# Patient Record
Sex: FEMALE | Race: BLACK OR AFRICAN AMERICAN | URBAN - METROPOLITAN AREA
[De-identification: names, ages, dates, MRNs, and addresses within clinical notes are randomized per-mention and may not be internally consistent; named-entity substitution may affect disease eponyms.]

---

## 2024-06-14 ENCOUNTER — TELEPHONE (OUTPATIENT)
Dept: HEMATOLOGY/ONCOLOGY | Facility: CLINIC | Age: 74
End: 2024-06-14
Payer: MEDICARE

## 2024-06-14 DIAGNOSIS — C90.00 MULTIPLE MYELOMA: Primary | ICD-10-CM

## 2024-06-14 NOTE — TELEPHONE ENCOUNTER
----- Message from Jad Chavarria sent at 6/14/2024  8:17 AM CDT -----  Regarding: Hematology Oncology Referral  New  Patient Intake Documentation     Diagnosis: Multiple Myeloma      Date patient referral received: 06/13/2024     Records collected: 06/13/2024       What doctor have you seen for this diagnosis?Elian Davey MD           Have you been diagnosed with cancer by a biopsy and/or surgery? Bone Marrow Biopsy     Date of biopsy:05/06/2024    Date of surgery:05/06/2024    Where did that occur? [facility]     Pt Referral has been placed in

## 2024-06-14 NOTE — NURSING
Oncology Navigation   Intake  Cancer Type: Myeloma  Type of Referral: External  Date of Referral: 06/14/24  Initial Nurse Navigator Contact: 06/14/24  Referral to Initial Contact Timeline (days): 0  First Appointment Available: 07/25/24 (ok per dr lott, asked in person)  Appointment Date: 07/25/24  First Available Date vs. Scheduled Date (days): 0  Multiple appointments: No     Treatment                              Acuity      Follow Up  No follow-ups on file.

## 2024-06-17 ENCOUNTER — TELEPHONE (OUTPATIENT)
Dept: HEMATOLOGY/ONCOLOGY | Facility: CLINIC | Age: 74
End: 2024-06-17
Payer: MEDICARE

## 2024-06-17 NOTE — TELEPHONE ENCOUNTER
Called and spoke to  Patient       Appointment scheduled on 7/25 with Dr Tovar.  All questions and concerns addressed. Provided my name and direct number and instructed Patient  to call with any questions and/or concerns.     Thanks,  VIOLETA GutierrezN, RN-BC  BMT Nurse Navigator  [facility] [address], [place], [place] [address]  _________________________  o [phone]

## 2024-06-17 NOTE — TELEPHONE ENCOUNTER
----- Message from Hardeep Drew sent at 6/14/2024  2:52 PM CDT -----  Regarding: RE: Hematology Oncology Referral  Will  have to call patient to inquiry about what medicare supplement he has.    Rajendra  ----- Message -----  From: Finesse Charles RN  Sent: 6/14/2024   2:38 PM CDT  To: Hardeep Drew  Subject: FW: Hematology Oncology Referral                 Mrn [phone] please verify car t tx benefits  ----- Message -----  From: Jad Chavarria  Sent: 6/14/2024   8:17 AM CDT  To: [facility] Cancer Navigation  Subject: Hematology Oncology Referral                     New  Patient Intake Documentation     Diagnosis: Multiple Myeloma      Date patient referral received: 06/13/2024     Records collected: 06/13/2024       What doctor have you seen for this diagnosis?Elian Davey MD           Have you been diagnosed with cancer by a biopsy and/or surgery? Bone Marrow Biopsy     Date of biopsy:05/06/2024    Date of surgery:05/06/2024    Where did that occur? [facility]     Pt Referral has been placed in

## 2024-06-17 NOTE — NURSING
Oncology Navigation   Intake  Cancer Type: Lymphoma  Type of Referral: External  Date of Referral: 06/13/24  Initial Nurse Navigator Contact: 06/13/24  Referral to Initial Contact Timeline (days): 0  First Appointment Available: 07/25/24  Appointment Date: 07/25/24  First Available Date vs. Scheduled Date (days): 0  Multiple appointments: No     Treatment                              Acuity      Follow Up  No follow-ups on file.

## 2024-06-21 ENCOUNTER — LAB VISIT (OUTPATIENT)
Dept: LAB | Facility: HOSPITAL | Age: 74
End: 2024-06-21
Attending: INTERNAL MEDICINE
Payer: MEDICARE

## 2024-06-21 DIAGNOSIS — C90.00 MULTIPLE MYELOMA: ICD-10-CM

## 2024-06-21 PROCEDURE — 88325 CONSLTJ COMPRE RVW REC REPRT: CPT | Performed by: PATHOLOGY

## 2024-06-26 LAB
FINAL PATHOLOGIC DIAGNOSIS: NORMAL
GROSS: NORMAL
Lab: NORMAL
MICROSCOPIC EXAM: NORMAL
SUPPLEMENTAL DIAGNOSIS: NORMAL

## 2024-07-25 ENCOUNTER — OFFICE VISIT (OUTPATIENT)
Dept: HEMATOLOGY/ONCOLOGY | Facility: CLINIC | Age: 74
End: 2024-07-25
Payer: MEDICARE

## 2024-07-25 ENCOUNTER — LAB VISIT (OUTPATIENT)
Dept: LAB | Facility: HOSPITAL | Age: 74
End: 2024-07-25
Payer: MEDICARE

## 2024-07-25 VITALS
SYSTOLIC BLOOD PRESSURE: 169 MMHG | RESPIRATION RATE: 20 BRPM | HEART RATE: 65 BPM | DIASTOLIC BLOOD PRESSURE: 73 MMHG | HEIGHT: 63 IN | TEMPERATURE: 98 F | BODY MASS INDEX: 31.33 KG/M2 | WEIGHT: 176.81 LBS | OXYGEN SATURATION: 98 %

## 2024-07-25 DIAGNOSIS — C90.00 MULTIPLE MYELOMA, REMISSION STATUS UNSPECIFIED: Primary | ICD-10-CM

## 2024-07-25 DIAGNOSIS — C90.00 MULTIPLE MYELOMA, REMISSION STATUS UNSPECIFIED: ICD-10-CM

## 2024-07-25 LAB
ALBUMIN SERPL BCP-MCNC: 3.6 G/DL (ref 3.5–5.2)
ALP SERPL-CCNC: 93 U/L (ref 55–135)
ALT SERPL W/O P-5'-P-CCNC: 13 U/L (ref 10–44)
ANION GAP SERPL CALC-SCNC: 6 MMOL/L (ref 8–16)
AST SERPL-CCNC: 13 U/L (ref 10–40)
BASOPHILS # BLD AUTO: 0.01 K/UL (ref 0–0.2)
BASOPHILS NFR BLD: 0.3 % (ref 0–1.9)
BILIRUB SERPL-MCNC: 0.8 MG/DL (ref 0.1–1)
BUN SERPL-MCNC: 13 MG/DL (ref 8–23)
CALCIUM SERPL-MCNC: 9.2 MG/DL (ref 8.7–10.5)
CHLORIDE SERPL-SCNC: 111 MMOL/L (ref 95–110)
CO2 SERPL-SCNC: 25 MMOL/L (ref 23–29)
CREAT SERPL-MCNC: 1.1 MG/DL (ref 0.5–1.4)
DIFFERENTIAL METHOD BLD: ABNORMAL
EOSINOPHIL # BLD AUTO: 0.1 K/UL (ref 0–0.5)
EOSINOPHIL NFR BLD: 2.5 % (ref 0–8)
ERYTHROCYTE [DISTWIDTH] IN BLOOD BY AUTOMATED COUNT: 19.9 % (ref 11.5–14.5)
EST. GFR  (NO RACE VARIABLE): 53.7 ML/MIN/1.73 M^2
GLUCOSE SERPL-MCNC: 101 MG/DL (ref 70–110)
HCT VFR BLD AUTO: 28.3 % (ref 37–48.5)
HGB BLD-MCNC: 10 G/DL (ref 12–16)
IGA SERPL-MCNC: 29 MG/DL (ref 40–350)
IGG SERPL-MCNC: 406 MG/DL (ref 650–1600)
IGM SERPL-MCNC: 9 MG/DL (ref 50–300)
IMM GRANULOCYTES # BLD AUTO: 0.01 K/UL (ref 0–0.04)
IMM GRANULOCYTES NFR BLD AUTO: 0.3 % (ref 0–0.5)
LYMPHOCYTES # BLD AUTO: 0.6 K/UL (ref 1–4.8)
LYMPHOCYTES NFR BLD: 17.7 % (ref 18–48)
MCH RBC QN AUTO: 32.6 PG (ref 27–31)
MCHC RBC AUTO-ENTMCNC: 35.3 G/DL (ref 32–36)
MCV RBC AUTO: 92 FL (ref 82–98)
MONOCYTES # BLD AUTO: 0.6 K/UL (ref 0.3–1)
MONOCYTES NFR BLD: 18.3 % (ref 4–15)
NEUTROPHILS # BLD AUTO: 2 K/UL (ref 1.8–7.7)
NEUTROPHILS NFR BLD: 60.9 % (ref 38–73)
NRBC BLD-RTO: 0 /100 WBC
PLATELET # BLD AUTO: 97 K/UL (ref 150–450)
PMV BLD AUTO: 12.2 FL (ref 9.2–12.9)
POTASSIUM SERPL-SCNC: 4.2 MMOL/L (ref 3.5–5.1)
PROT SERPL-MCNC: 5.9 G/DL (ref 6–8.4)
RBC # BLD AUTO: 3.07 M/UL (ref 4–5.4)
SODIUM SERPL-SCNC: 142 MMOL/L (ref 136–145)
WBC # BLD AUTO: 3.22 K/UL (ref 3.9–12.7)

## 2024-07-25 PROCEDURE — 84165 PROTEIN E-PHORESIS SERUM: CPT | Performed by: INTERNAL MEDICINE

## 2024-07-25 PROCEDURE — 36415 COLL VENOUS BLD VENIPUNCTURE: CPT | Performed by: INTERNAL MEDICINE

## 2024-07-25 PROCEDURE — 83521 IG LIGHT CHAINS FREE EACH: CPT | Mod: 59 | Performed by: INTERNAL MEDICINE

## 2024-07-25 PROCEDURE — 80053 COMPREHEN METABOLIC PANEL: CPT | Performed by: INTERNAL MEDICINE

## 2024-07-25 PROCEDURE — G2211 COMPLEX E/M VISIT ADD ON: HCPCS | Mod: S$PBB,,, | Performed by: INTERNAL MEDICINE

## 2024-07-25 PROCEDURE — 99999 PR PBB SHADOW E&M-EST. PATIENT-LVL IV: CPT | Mod: PBBFAC,,, | Performed by: INTERNAL MEDICINE

## 2024-07-25 PROCEDURE — 84165 PROTEIN E-PHORESIS SERUM: CPT | Mod: 26,,, | Performed by: PATHOLOGY

## 2024-07-25 PROCEDURE — 99205 OFFICE O/P NEW HI 60 MIN: CPT | Mod: S$PBB,GC,, | Performed by: INTERNAL MEDICINE

## 2024-07-25 PROCEDURE — 85025 COMPLETE CBC W/AUTO DIFF WBC: CPT | Performed by: INTERNAL MEDICINE

## 2024-07-25 PROCEDURE — 86334 IMMUNOFIX E-PHORESIS SERUM: CPT | Mod: 26,,, | Performed by: PATHOLOGY

## 2024-07-25 PROCEDURE — 82784 ASSAY IGA/IGD/IGG/IGM EACH: CPT | Performed by: INTERNAL MEDICINE

## 2024-07-25 PROCEDURE — 99214 OFFICE O/P EST MOD 30 MIN: CPT | Mod: PBBFAC | Performed by: INTERNAL MEDICINE

## 2024-07-25 PROCEDURE — 86334 IMMUNOFIX E-PHORESIS SERUM: CPT | Performed by: INTERNAL MEDICINE

## 2024-07-25 RX ORDER — CARVEDILOL 25 MG/1
TABLET ORAL
COMMUNITY

## 2024-07-25 RX ORDER — HYDROCORTISONE 25 MG/G
CREAM TOPICAL 2 TIMES DAILY
COMMUNITY
Start: 2024-06-14

## 2024-07-25 RX ORDER — METFORMIN HYDROCHLORIDE 1000 MG/1
1000 TABLET ORAL 2 TIMES DAILY
COMMUNITY
Start: 2024-02-06

## 2024-07-25 RX ORDER — HYDROCHLOROTHIAZIDE 12.5 MG/1
12.5 CAPSULE ORAL
COMMUNITY
Start: 2024-07-21

## 2024-07-25 RX ORDER — NAPROXEN AND ESOMEPRAZOLE MAGNESIUM 20; 500 MG/1; MG/1
1 TABLET, DELAYED RELEASE ORAL EVERY 12 HOURS PRN
COMMUNITY
Start: 2024-02-10

## 2024-07-25 RX ORDER — CLOTRIMAZOLE 1 %
CREAM (GRAM) TOPICAL 2 TIMES DAILY
COMMUNITY
Start: 2024-05-15

## 2024-07-25 RX ORDER — ONDANSETRON 8 MG/1
8 TABLET, ORALLY DISINTEGRATING ORAL EVERY 8 HOURS PRN
COMMUNITY
Start: 2024-05-17 | End: 2025-05-17

## 2024-07-25 RX ORDER — FLUTICASONE PROPIONATE 50 MCG
2 SPRAY, SUSPENSION (ML) NASAL
COMMUNITY
Start: 2023-12-07

## 2024-07-25 RX ORDER — POTASSIUM CHLORIDE 750 MG/1
10 TABLET, EXTENDED RELEASE ORAL
COMMUNITY
Start: 2024-05-30 | End: 2025-05-30

## 2024-07-25 RX ORDER — DEXAMETHASONE 4 MG/1
TABLET ORAL
COMMUNITY
Start: 2024-05-07

## 2024-07-25 RX ORDER — ATORVASTATIN CALCIUM 40 MG/1
40 TABLET, FILM COATED ORAL
COMMUNITY
Start: 2024-05-02 | End: 2025-05-03

## 2024-07-25 RX ORDER — OMEPRAZOLE 40 MG/1
40 CAPSULE, DELAYED RELEASE ORAL
COMMUNITY
Start: 2024-06-11

## 2024-07-25 RX ORDER — FUROSEMIDE 20 MG/1
20 TABLET ORAL
COMMUNITY
Start: 2024-05-15 | End: 2025-05-16

## 2024-07-25 RX ORDER — LENALIDOMIDE 15 MG/1
CAPSULE ORAL
COMMUNITY
Start: 2024-05-09

## 2024-07-25 RX ORDER — BLOOD-GLUCOSE METER
EACH MISCELLANEOUS
COMMUNITY

## 2024-07-25 RX ORDER — GLIPIZIDE 2.5 MG/1
2.5 TABLET, EXTENDED RELEASE ORAL
COMMUNITY
Start: 2024-05-15

## 2024-07-26 LAB
ALBUMIN SERPL ELPH-MCNC: 3.59 G/DL (ref 3.35–5.55)
ALPHA1 GLOB SERPL ELPH-MCNC: 0.31 G/DL (ref 0.17–0.41)
ALPHA2 GLOB SERPL ELPH-MCNC: 0.63 G/DL (ref 0.43–0.99)
B-GLOBULIN SERPL ELPH-MCNC: 0.61 G/DL (ref 0.5–1.1)
GAMMA GLOB SERPL ELPH-MCNC: 0.36 G/DL (ref 0.67–1.58)
INTERPRETATION SERPL IFE-IMP: NORMAL
KAPPA LC SER QL IA: 0.75 MG/DL (ref 0.33–1.94)
KAPPA LC/LAMBDA SER IA: 0.82 (ref 0.26–1.65)
LAMBDA LC SER QL IA: 0.91 MG/DL (ref 0.57–2.63)
PROT SERPL-MCNC: 5.5 G/DL (ref 6–8.4)

## 2024-07-28 NOTE — PROGRESS NOTES
HEMATOLOGY NEW PATIENT EVALUATION      Subjective:     IDENTIFYING STATEMENT:   Shyann Diaz (Shyann) is a 73 y.o. female with a date of birth of 1950 from [place], [place] with the diagnosis of MM who was seen at the request of Dr. Davey, Elian LAI MD  [address] [place] [id number] and is here for evaluation for further evaluation.    HPI:     She was admitted to the hospital in 05/2024, found to be in severe BRIANA and on further work up diagnosed with multiple myeloma, started treatment with Claudine-VRd. C1 Claudine-VRd started 05/10/2024 and last dose Claudine-Bortezomib on 07/19/2024.     She is accompanied by her two sisters. She is currently doing okay, uses cane to ambulate. She is not driving but has excellent family support. Her appetite is slowly improving. Diarrhea worse on some days than other but has not required imodium consistently. Pain is persistent but somewhat improvement after RT.     Myeloma History:   - Bone marrow biopsy 5/3/2024: lambda-restricted plasma cell neoplasm, plasma cell up to 50%. Cytogenetics: Gain 1q, Trisomy 9, 11, and 17. Monosomy 13.  - FDG PET/CT 5/22/2024: multiple lytic lesions to c, t, and l-spine as well as proximal femoral diaphyses.   - Treatment:   - RT to L2 and L pelvis with Dr. Christopher, completed in 06/2024   - Claudine-VRd started 05/10/204     Review of Systems   Constitutional:  Positive for malaise/fatigue. Negative for chills, fever and weight loss.   HENT: Negative.  Negative for congestion and hearing loss.    Eyes: Negative.    Respiratory: Negative.     Cardiovascular:  Positive for leg swelling. Negative for chest pain, palpitations and orthopnea.   Gastrointestinal: Negative.    Genitourinary: Negative.    Musculoskeletal:  Positive for joint pain.   Skin:  Positive for rash.   Neurological: Negative.    Endo/Heme/Allergies: Negative.    Psychiatric/Behavioral: Negative.         No past medical history on file.    No family history  "on file.         Medication List with Changes/Refills   Current Medications    ACCU-CHEK GUIDE ME GLUCOSE MTR MISC    USE 1 STRIP TO CHECK GLUCOSE ONCE DAILY    ATORVASTATIN (LIPITOR) 40 MG TABLET    Take 40 mg by mouth.    BACILLUS-PROTEASE-AMYLAS-LIPAS 1 BILLION CELL- 30,000 UNIT CAP    Take 1 capsule by mouth once daily.    CARVEDILOL (COREG) 25 MG TABLET    SMARTSI Tablet(s) By Mouth Morning-Evening    CLOTRIMAZOLE (LOTRIMIN) 1 % CREAM    Apply topically 2 (two) times daily.    DEXAMETHASONE (DECADRON) 4 MG TAB    SMARTSIG:10 Tablet(s) By Mouth Every Morning    FLUTICASONE PROPIONATE (FLONASE) 50 MCG/ACTUATION NASAL SPRAY    2 sprays by Nasal route.    FUROSEMIDE (LASIX) 20 MG TABLET    Take 20 mg by mouth.    GLIPIZIDE (GLUCOTROL) 2.5 MG TR24    Take 2.5 mg by mouth.    HYDROCHLOROTHIAZIDE (MICROZIDE) 12.5 MG CAPSULE    Take 12.5 mg by mouth.    HYDROCORTISONE 2.5 % CREAM    Apply topically 2 (two) times daily.    INV ASPIRIN TABLET    Take 81 mg by mouth.    LENALIDOMIDE 15 MG CAP    Take for 21 days then off 7 days    METFORMIN (GLUCOPHAGE) 1000 MG TABLET    Take 1,000 mg by mouth 2 (two) times daily.    NAPROXEN-ESOMEPRAZOLE (VIMOVO) 500-20 MG TBID    Take 1 tablet by mouth every 12 (twelve) hours as needed.    OMEPRAZOLE (PRILOSEC) 40 MG CAPSULE    Take 40 mg by mouth.    ONDANSETRON (ZOFRAN-ODT) 8 MG TBDL    Take 8 mg by mouth every 8 (eight) hours as needed.    POTASSIUM CHLORIDE SA (K-DUR,KLOR-CON M) 10 MEQ TABLET    Take 10 mEq by mouth.     Review of patient's allergies indicates:   Allergen Reactions    Iodine Hives and Itching    Iohexol Other (See Comments)     BLISTERS       There is no problem list on file for this patient.          Objective:      BP (!) 169/73 (BP Location: Left arm, Patient Position: Sitting, BP Method: Medium (Automatic))   Pulse 65   Temp 97.7 °F (36.5 °C) (Oral)   Resp 20   Ht 5' 3" (1.6 m)   Wt 80.2 kg (176 lb 12.9 oz)   SpO2 98%   BMI 31.32 kg/m²   Estimated body " "mass index is 31.32 kg/m² as calculated from the following:    Height as of this encounter: 5' 3" (1.6 m).    Weight as of this encounter: 80.2 kg (176 lb 12.9 oz).  Physical Exam  Constitutional:       General: She is not in acute distress.     Appearance: Normal appearance. She is ill-appearing.   HENT:      Head: Normocephalic and atraumatic.   Eyes:      Extraocular Movements: Extraocular movements intact.      Conjunctiva/sclera: Conjunctivae normal.      Pupils: Pupils are equal, round, and reactive to light.   Cardiovascular:      Rate and Rhythm: Normal rate and regular rhythm.   Pulmonary:      Effort: Pulmonary effort is normal.      Breath sounds: Normal breath sounds. No wheezing.   Abdominal:      General: Abdomen is flat. Bowel sounds are normal. There is no distension.      Palpations: Abdomen is soft.   Musculoskeletal:      Right lower leg: Edema present.      Left lower leg: Edema present.   Skin:     General: Skin is warm.      Capillary Refill: Capillary refill takes less than 2 seconds.   Neurological:      General: No focal deficit present.      Mental Status: She is alert and oriented to person, place, and time.   Psychiatric:         Mood and Affect: Mood normal.         Behavior: Behavior normal.         Thought Content: Thought content normal.       Bone Marrow Biopsy 5/6/24:  Results for orders placed or performed in visit on 06/21/24   Specimen to Pathology Other   Result Value Ref Range    Final Pathologic Diagnosis       BONE MARROW ASPIRATE, CELL PARTICLE, AND DECALCIFIED CORE BIOPSY:   RIGHT POSTEROSUPERIOR ILIAC CREST: Outside institution consultation slides (9) and block (B1) from [place], [place] (accession #: [id number], collected: 5/6/24)  - LAMBDA LIGHT CHAIN RESTRICTED, CD56+, + PLASMA CELL NEOPLASM  - LIMITED SPECIMEN: Findings may not be entirely representative  - Variably cellular marrow (20-50% total cellularity) with 60-70% involvement by " plasma cell neoplasm and residual background trilineage hematopoiesis  - Lambda light chain restricted, CD56+, + plasma cells (4% of total analyzed events) detected by flow cytometry  - Normal female karyotype: 46,XX[20]  - FISH positive for gain of 1q, trisomies 9, 11, and 17, monosomy 13, and IGH rearrangement and loss of MAF  - No stainable histiocytic iron stores   - No significantly increased reticulin fibrosis (MF-0)    PERIPHERAL BLOOD: Outside institution consultation slide (1) from [place], [place] (collected: 5/6/24 @ 16:36)  - No circulating plasma cells  - Moderate normocytic, normochromic anemia with no significant rouleaux formation      Supplemental Diagnosis       Clinical diagnosis / information and documentation of slides received (gross section) were added. Remainder of report remains unchanged.    Gross       Please see scanned original pathology report(s) for gross description.  [id number] (10 stained slides, 1 block, collected: 5/6/24) received from [place], [place] with corresponding pathology and ancillary studies reports (see scanned/attached reports for complete details): 1 stained peripheral   blood smear (05/06/24: [id number]), 3 stained aspirate smears (05/07/24: [id number], [id number], [id number]), 2 H&E slides: 1 cell particle (05/06/24: [id number]), 1 decalcified core biopsy (05/06/24: [id number]), 1 IHC on B1 with   control on same slide:  (05/07/24: [id number]), 3 special stains: 1 iron-stained aspirate smear (05/06/24: [id number]), 1 iron-stained decalcified core biopsy (05/06/24: [id number]), 1 reticulin-stained decalcified core biopsy   (05/06/24: [id number]). One (1) block labeled [id number] and no unstained tissue slides were received.    Flow cytometric analysis (per reference lab report, performe d and interpreted at [facility], [place], [place]; reported:  5/7/24, #[id number]):     Monoclonal plasma cells identified. Using a limited staining panel demonstrates that approximately 4% of total leukocytes are plasma cells, as determined by co-expression of CD45, , and CD38 (bright). This population is positive for expression of   CD56 and  and negative for CD19. The plasma cells are monoclonal as determined by cytoplasmic kappa:lambda ratio (0.31). As the quantification of plasma cells by flow cytometry is technically limited and often underestimated, quantification of the   plasma cell burden must be correlated with the morphologic findings.    Myeloma/PCN FISH panel analysis (per outside institution pathology report, performed and interpreted at [facility] [place], [place]; reported: 6/17/24, #[id number]):     POSITIVE for gain of 1q, trisomies 9, 11, and 17, monosomy 13, and IGH rearrangement and loss of MAF    Negative for MYC rearrangement and for IGH::FG FR3 (4;14), IGH::CCND3 (6;14), IGH:: MYC (8;14), IGH::CCND1 (11;14), IGH::MAF (14;16), and IGH::MAFB (14;20) fusions    Cytogenetic analysis (per outside institution pathology report, performed and interpreted at [place], [place]; reported: 5/14/24, #[id number]):     Normal karyotype: 46,XX[20].      Microscopic Exam       PERIPHERAL BLOOD (5/6/24 @ 16:36, per outside institution patology report):  WBC: 7.4 k/uL, HGB: 8.2 g/dL, HCT: 23.6%, MCV: 81.1 fL, MCH: 28.2 pg, Platelets: 176 k/uL, Neutrophils: 60%, Lymphocytes: 36%, Monocytes: --%, Eosinophils: 4%, Basophils: --%    One peripheral blood smear available for review.    RBC: Moderate normocytic, normochromic anemia with mild anisopoikilocytosis with no significantly increased polychromasia, rouleaux formation, or agglutinins  WBC & Platelets: No significant diagnostic abnormalities. No circulating plasma cells    BONE MARROW ADEQUACY:  Aspirate: Non-diagnostic, aspiculate, hemodilute, and very paucicellular  Cell Particle:  Non-diagnostic, minute fragment of cortical bone   Biopsy: Adequate but predominantly tangential cortical bone in 1/2 cores; 20-50% total cellularity    BONE MARROW ASPIRATE: Findings not  representative of the marrow  No cytomorphologic evaluation or manual differential count were performed.    Iron Stain (1 smear performed at the  outside institution and reviewed at [facility]): Stainable histiocytic iron stores and ring sideroblasts cannot be optimally evaluated due to aspiculate, hemodilute stained aspirate smear.     BONE MARROW CLOT & CORE BIOPSY: Tissue sections show variably cellular marrow with increased focal sheets of plasma cells with very rare Era bodies and residual trilineage hematopoiesis. Megakaryocytes appear adequate in number and show no   significant atypical features or abnormal clusters. No overt increase in lymphocytes or lymphoid aggregates are appreciated.    Tissue sections show no morphologic evidence of metastatic carcinoma infiltration, granulomata, fibrosis, or necrosis. Bony trabeculae are unremarkable.     immunostain and iron and reticulin special stains were performed with adequate controls on block B1 at the outside institution and reviewed at [facility] ([place], [place]).      highlights increased numbers of plasma cells (60-70% of total  cellularity).     Iron: No stainable histiocytic iron stores  Reticulin: No significantly increased reticulin fibrosis (MF-0)      Disclaimer       Unless the case is a 'gross only' or additional testing only, the final diagnosis for each specimen is based on a microscopic examination of appropriate tissue sections.         Assessment and Plan:         Diagnoses and all orders for this visit:    Multiple myeloma, remission status unspecified  -     Comprehensive Metabolic Panel; Standing  -     CBC Auto Differential; Standing  -     Protein Electrophoresis, Serum; Standing  -     Immunoglobulins (IgG, IgA,  IgM) Quantitative; Standing  -     Immunoglobulin Free LT Chains Blood; Standing  -     Immunofixation Electrophoresis; Standing          # [facility] MM ISS 2   Diagnosed 05/2024. Currently followed by local hematology oncology Dr. Davey, started treatment with Claudine-VRd in 05/2024.     Recommendation:   She is not a ASCT candidate secondary to her multiple comorbidities, function status, and age. Agree with Dr. Davey to continue treatment with Claudine-VRd. Reasonable to obtain imaging and myeloma labs and switch to maintenance treatment after C6, if stable. We will collect myeloma labs today for reference.       Discussed with Dr. Guy Kaur MD  Hematology and Oncology Fellow  [facility] [place]

## 2024-07-29 LAB
PATHOLOGIST INTERPRETATION IFE: NORMAL
PATHOLOGIST INTERPRETATION SPE: NORMAL